# Patient Record
Sex: MALE | Race: OTHER | Employment: PART TIME
[De-identification: names, ages, dates, MRNs, and addresses within clinical notes are randomized per-mention and may not be internally consistent; named-entity substitution may affect disease eponyms.]

---

## 2023-01-08 PROBLEM — Z91.119 DIETARY NONCOMPLIANCE: Status: ACTIVE | Noted: 2023-01-08

## 2023-01-08 PROBLEM — E11.9 NEWLY DIAGNOSED DIABETES (HCC): Status: ACTIVE | Noted: 2023-01-08

## 2023-09-14 ENCOUNTER — NURSE TRIAGE (OUTPATIENT)
Dept: OTHER | Facility: CLINIC | Age: 28
End: 2023-09-14

## 2023-09-14 ENCOUNTER — HOSPITAL ENCOUNTER (OUTPATIENT)
Age: 28
Discharge: HOME | End: 2023-09-14
Payer: MEDICAID

## 2023-09-14 ENCOUNTER — OFFICE VISIT (OUTPATIENT)
Dept: ENDOCRINOLOGY | Age: 28
End: 2023-09-14
Payer: MEDICAID

## 2023-09-14 ENCOUNTER — TELEPHONE (OUTPATIENT)
Dept: ADMINISTRATIVE | Age: 28
End: 2023-09-14

## 2023-09-14 VITALS
RESPIRATION RATE: 18 BRPM | DIASTOLIC BLOOD PRESSURE: 84 MMHG | WEIGHT: 144 LBS | HEART RATE: 89 BPM | BODY MASS INDEX: 23.99 KG/M2 | HEIGHT: 65 IN | OXYGEN SATURATION: 98 % | SYSTOLIC BLOOD PRESSURE: 137 MMHG

## 2023-09-14 DIAGNOSIS — Z91.119 DIETARY NONCOMPLIANCE: ICD-10-CM

## 2023-09-14 DIAGNOSIS — E55.9 VITAMIN D DEFICIENCY: ICD-10-CM

## 2023-09-14 DIAGNOSIS — E11.9 NEWLY DIAGNOSED DIABETES (HCC): Primary | ICD-10-CM

## 2023-09-14 DIAGNOSIS — E11.9 NEWLY DIAGNOSED DIABETES (HCC): ICD-10-CM

## 2023-09-14 LAB
25(OH)D3 SERPL-MCNC: 19.5 NG/ML (ref 30–100)
ALBUMIN SERPL-MCNC: 4.3 G/DL (ref 3.5–5.2)
ALP SERPL-CCNC: 120 U/L (ref 40–129)
ALT SERPL-CCNC: 19 U/L (ref 0–40)
ANION GAP SERPL CALCULATED.3IONS-SCNC: 9 MMOL/L (ref 7–16)
AST SERPL-CCNC: 17 U/L (ref 0–39)
BASOPHILS # BLD: 0.02 K/UL (ref 0–0.2)
BASOPHILS NFR BLD: 1 % (ref 0–2)
BILIRUB SERPL-MCNC: 0.2 MG/DL (ref 0–1.2)
BUN SERPL-MCNC: 12 MG/DL (ref 6–20)
CALCIUM SERPL-MCNC: 9.6 MG/DL (ref 8.6–10.2)
CHLORIDE SERPL-SCNC: 97 MMOL/L (ref 98–107)
CHOLEST SERPL-MCNC: 211 MG/DL
CO2 SERPL-SCNC: 29 MMOL/L (ref 22–29)
CREAT SERPL-MCNC: 0.8 MG/DL (ref 0.7–1.2)
CREAT UR-MCNC: 56.4 MG/DL (ref 40–278)
EOSINOPHIL # BLD: 0.16 K/UL (ref 0.05–0.5)
EOSINOPHILS RELATIVE PERCENT: 4 % (ref 0–6)
ERYTHROCYTE [DISTWIDTH] IN BLOOD BY AUTOMATED COUNT: 12.4 % (ref 11.5–15)
GFR SERPL CREATININE-BSD FRML MDRD: >60 ML/MIN/1.73M2
GLUCOSE SERPL-MCNC: 447 MG/DL (ref 74–99)
HCT VFR BLD AUTO: 46.2 % (ref 37–54)
HDLC SERPL-MCNC: 54 MG/DL
HGB BLD-MCNC: 14.9 G/DL (ref 12.5–16.5)
IMM GRANULOCYTES # BLD AUTO: <0.03 K/UL (ref 0–0.58)
IMM GRANULOCYTES NFR BLD: 0 % (ref 0–5)
LDLC SERPL CALC-MCNC: 124 MG/DL
LYMPHOCYTES NFR BLD: 1.28 K/UL (ref 1.5–4)
LYMPHOCYTES RELATIVE PERCENT: 33 % (ref 20–42)
MCH RBC QN AUTO: 26.9 PG (ref 26–35)
MCHC RBC AUTO-ENTMCNC: 32.3 G/DL (ref 32–34.5)
MCV RBC AUTO: 83.5 FL (ref 80–99.9)
MICROALBUMIN UR-MCNC: <12 MG/L (ref 0–19)
MICROALBUMIN/CREAT UR-RTO: NORMAL MCG/MG CREAT (ref 0–30)
MONOCYTES NFR BLD: 0.23 K/UL (ref 0.1–0.95)
MONOCYTES NFR BLD: 6 % (ref 2–12)
NEUTROPHILS NFR BLD: 57 % (ref 43–80)
NEUTS SEG NFR BLD: 2.21 K/UL (ref 1.8–7.3)
PLATELET # BLD AUTO: 244 K/UL (ref 130–450)
PMV BLD AUTO: 11 FL (ref 7–12)
POTASSIUM SERPL-SCNC: 4.6 MMOL/L (ref 3.5–5)
PROT SERPL-MCNC: 7.2 G/DL (ref 6.4–8.3)
RBC # BLD AUTO: 5.53 M/UL (ref 3.8–5.8)
SODIUM SERPL-SCNC: 135 MMOL/L (ref 132–146)
TRIGL SERPL-MCNC: 166 MG/DL
VLDLC SERPL CALC-MCNC: 33 MG/DL
WBC OTHER # BLD: 3.9 K/UL (ref 4.5–11.5)

## 2023-09-14 PROCEDURE — 80053 COMPREHEN METABOLIC PANEL: CPT

## 2023-09-14 PROCEDURE — 82570 ASSAY OF URINE CREATININE: CPT

## 2023-09-14 PROCEDURE — 82043 UR ALBUMIN QUANTITATIVE: CPT

## 2023-09-14 PROCEDURE — 36415 COLL VENOUS BLD VENIPUNCTURE: CPT

## 2023-09-14 PROCEDURE — 85025 COMPLETE CBC W/AUTO DIFF WBC: CPT

## 2023-09-14 PROCEDURE — 82306 VITAMIN D 25 HYDROXY: CPT

## 2023-09-14 PROCEDURE — 84681 ASSAY OF C-PEPTIDE: CPT

## 2023-09-14 PROCEDURE — 80061 LIPID PANEL: CPT

## 2023-09-14 PROCEDURE — 3051F HG A1C>EQUAL 7.0%<8.0%: CPT | Performed by: FAMILY MEDICINE

## 2023-09-14 PROCEDURE — 99214 OFFICE O/P EST MOD 30 MIN: CPT | Performed by: FAMILY MEDICINE

## 2023-09-14 PROCEDURE — 86341 ISLET CELL ANTIBODY: CPT

## 2023-09-14 RX ORDER — BLOOD-GLUCOSE SENSOR
EACH MISCELLANEOUS
Qty: 6 EACH | Refills: 3 | Status: SHIPPED | OUTPATIENT
Start: 2023-09-14

## 2023-09-14 RX ORDER — INSULIN LISPRO 100 [IU]/ML
INJECTION, SOLUTION INTRAVENOUS; SUBCUTANEOUS
Qty: 5 ADJUSTABLE DOSE PRE-FILLED PEN SYRINGE | Refills: 5 | Status: SHIPPED | OUTPATIENT
Start: 2023-09-14

## 2023-09-14 RX ORDER — INSULIN GLARGINE 100 [IU]/ML
12 INJECTION, SOLUTION SUBCUTANEOUS NIGHTLY
Qty: 5 ADJUSTABLE DOSE PRE-FILLED PEN SYRINGE | Refills: 5 | Status: SHIPPED | OUTPATIENT
Start: 2023-09-14

## 2023-09-14 NOTE — TELEPHONE ENCOUNTER
Location of patient: [address] call from Landon Offer at [employer] with [employer]. Subjective: Caller states \"blood sugar over 400\"     Current Symptoms: elevated blood sugar. Last night 419, used 20 units and then 15 units. Went down to 110 after the 15 units    FBS this morning- 400    Has not taken Metformin for about 2 months. Eating more. Has not been checking glucose until this week. Dizzy, sleep    Pain in back of head from ear to ear    Onset: unknown     Associated Symptoms: reduced activity, increased sleepiness    Pain Severity: 3-4/10; heaviness; intermittent    Temperature: none     What has been tried: nothing    LMP: NA Pregnant: NA    Recommended disposition:  Discuss with PCP and Callback by Nurse within 1 hour. [address] [place] [address] Drive Endocrinology- got the Neurology office. The lady that answered transferred us to Endocrinology at [phone] Phone ringing. Got VM and left message for them to call patient back. Sent triage encounter HIGH PRIORITY to Dr Apurva Trimble. Care advice provided, patient verbalizes understanding; denies any other questions or concerns; instructed to call back for any new or worsening symptoms. Attention Provider: Thank you for allowing me to participate in the care of your patient. The patient was connected to triage in response to information provided to the ECC/PSC. Please do not respond through this encounter as the response is not directed to a shared pool.           Reason for Disposition   Blood glucose > 400 mg/dL (22.2 mmol/L)    Protocols used: Diabetes - High Blood Sugar-ADULT-[place]

## 2023-09-14 NOTE — PROGRESS NOTES
[facility] Department of Endocrinology Diabetes and Metabolism   [address] [facility] [address]   Phone: [phone]  Fax: [phone]    Date of Service: 9/14/2023  Primary Care Physician: No primary care provider on file. Referring physician: No ref. provider found  Provider: HARMEET Oseguera CNP    Reason for the visit:  Newly diagnosed DM     History of Present Illness: The history is provided by the patient. No  was used. Accuracy of the patient data is excellent. Izzy Turner is a very pleasant 25 y.o. male seen today for diabetes management     Izzy Turner was diagnosed with diabetes 9/2022 on a routine labs with A1c 12.6%   Pt was started on long acting insulin for a short time then was able to control his diabetes with dietary changes without medication. Yesterday he developed dizziness, fatigue, and severe headache. Checked his BS > 400. He took a family members rapid acting insulin 25 units twice. At one point his BS dropped to 40 but states it went back up in 400's quickly after eating. Has not been checking blood sugar prior to that. A1C today 10.3%  Most recent A1c results summarized below  Lab Results   Component Value Date/Time    LABA1C 7.3 01/05/2023 02:21 PM    LABA1C 12.6 09/18/2022 12:00 AM     Patient has had no hypoglycemic episodes   The patient has not been mindful of what has been eating and following diabetic diet as encouraged  I reviewed current medications and the patient has no issues with diabetes medications  The patient is due for an eye exam. no h/o diabetic retinopathy  The patient performs his own feet care  Microvascular complications:  No Retinopathy, Nephropathy or Neuropathy   Macrovascular complications: no CAD, PVD, or Stroke      PAST MEDICAL HISTORY   History reviewed. No pertinent past medical history. PAST SURGICAL HISTORY   History reviewed. No pertinent surgical history.     SOCIAL HISTORY

## 2023-09-14 NOTE — TELEPHONE ENCOUNTER
Patient called to schedule with Dr Susy Turner. Said his sugar is 406. I transferred patient to nurse Diogo Childers.

## 2023-09-16 LAB — C PEPTIDE SERPL-MCNC: 3.9 NG/ML (ref 1.1–4.4)

## 2023-09-18 ENCOUNTER — TELEPHONE (OUTPATIENT)
Dept: ENDOCRINOLOGY | Age: 28
End: 2023-09-18

## 2023-09-18 RX ORDER — ATORVASTATIN CALCIUM 20 MG/1
20 TABLET, FILM COATED ORAL DAILY
Qty: 90 TABLET | Refills: 1 | Status: SHIPPED | OUTPATIENT
Start: 2023-09-18

## 2023-09-18 NOTE — TELEPHONE ENCOUNTER
Please let patient know that I have reviewed his labs. Vitamin D is low. I have sent a prescription for vitamin D 50,000 IU weekly x12 weeks. When he is done with those 12 weeks, I recommend OTC vitamin D 2000 IU daily. We can repeat his level at his next office visit. Cholesterol is elevated. This will likely improve with better glucose control, however I recommend starting a atorvastatin 20 mg nightly. I have sent that prescription to his pharmacy. There is still 1 lab pending that will help us determine whether or not he is a type I or type II diabetic. We will call once that is resulted. I reviewed his karie. I would like to increase his Lantus to 14 units nightly. There are times throughout the day where he is borderline hypoglycemic. Can we make sure that he is not giving himself more than the ordered 4 units of Humalog plus MDSS. I will review his Shyla Vidal again next Monday.

## 2023-09-19 ENCOUNTER — TELEPHONE (OUTPATIENT)
Dept: ENDOCRINOLOGY | Age: 28
End: 2023-09-19

## 2023-09-19 LAB — GLUTAMIC ACID DECARB AB: 113.8 IU/ML (ref 0–5)

## 2023-09-19 NOTE — TELEPHONE ENCOUNTER
Please call and let patient know that his NIRALI antibody was elevated. This confirms a diagnosis of type 1 diabetes. He will need to remain on long-acting and short acting insulin for the rest of his life. Please stressed the importance of compliance with his insulin to remain out of DKA. Thank you!

## 2023-09-19 NOTE — TELEPHONE ENCOUNTER
Lvm for return call.  Pt has two result notes in phone encounters from today 09/19/2023. just [facility]

## 2023-09-20 NOTE — TELEPHONE ENCOUNTER
The pt was notified and med list adjusted. He is going to wait to start atorvastatin until he does some research on it. He will call us back.

## 2023-11-09 RX ORDER — CHOLECALCIFEROL (VITAMIN D3) 1250 MCG
CAPSULE ORAL
Qty: 12 CAPSULE | Refills: 0 | OUTPATIENT
Start: 2023-11-09

## 2023-11-15 RX ORDER — BLOOD-GLUCOSE SENSOR
EACH MISCELLANEOUS
Qty: 6 EACH | Refills: 3 | Status: SHIPPED | OUTPATIENT
Start: 2023-11-15

## 2023-11-27 ENCOUNTER — OFFICE VISIT (OUTPATIENT)
Dept: ENDOCRINOLOGY | Age: 28
End: 2023-11-27
Payer: MEDICAID

## 2023-11-27 VITALS
RESPIRATION RATE: 18 BRPM | DIASTOLIC BLOOD PRESSURE: 73 MMHG | WEIGHT: 152.8 LBS | HEIGHT: 65 IN | BODY MASS INDEX: 25.46 KG/M2 | HEART RATE: 79 BPM | OXYGEN SATURATION: 91 % | SYSTOLIC BLOOD PRESSURE: 103 MMHG

## 2023-11-27 DIAGNOSIS — Z91.119 DIETARY NONCOMPLIANCE: ICD-10-CM

## 2023-11-27 DIAGNOSIS — R42 DIZZINESS: ICD-10-CM

## 2023-11-27 DIAGNOSIS — E78.2 MIXED HYPERLIPIDEMIA: ICD-10-CM

## 2023-11-27 DIAGNOSIS — E10.65 TYPE 1 DIABETES MELLITUS WITH HYPERGLYCEMIA (HCC): Primary | ICD-10-CM

## 2023-11-27 DIAGNOSIS — E55.9 VITAMIN D DEFICIENCY: ICD-10-CM

## 2023-11-27 LAB — HBA1C MFR BLD: 7 %

## 2023-11-27 PROCEDURE — 83036 HEMOGLOBIN GLYCOSYLATED A1C: CPT | Performed by: FAMILY MEDICINE

## 2023-11-27 PROCEDURE — 3051F HG A1C>EQUAL 7.0%<8.0%: CPT | Performed by: FAMILY MEDICINE

## 2023-11-27 PROCEDURE — 95251 CONT GLUC MNTR ANALYSIS I&R: CPT | Performed by: FAMILY MEDICINE

## 2023-11-27 PROCEDURE — 99214 OFFICE O/P EST MOD 30 MIN: CPT | Performed by: FAMILY MEDICINE

## 2023-11-27 NOTE — PROGRESS NOTES
[facility] Department of Endocrinology Diabetes and Metabolism   [address] [facility] [place], [address] [facility]    Phone: [phone]  Fax: [phone]    Date of Service: 11/27/2023  Primary Care Physician: Serge Braxton MD  Referring physician: No ref. provider found  Provider: HARMEET Acosta CNP    Reason for the visit:  Type 1 Diabetes    History of Present Illness: The history is provided by the patient. No  was used. Accuracy of the patient data is excellent. Armond Andrade is a very pleasant 25 y.o. male seen today for diabetes management     Armond Andrade was diagnosed with diabetes 9/2022 on a routine labs with A1c 12.6%   Pt was started on long acting insulin for a short time then was able to control his diabetes with dietary changes without medication. C-peptide and emily antibody were ordered at that time, however he did not complete. He was lost to follow-up, then presented in September 2023 with hyperglycemia. C-peptide and emily antibody were ordered again at his September 2023 appointment and confirmed LENARD . Latest Reference Range & Units 09/14/23 16:48   C-Peptide 1.1 - 4.4 ng/mL 3.9      Latest Reference Range & Units 09/14/23 16:48   Glutamic Acid Decarb Ab 0.0 - 5.0 IU/mL 113.8 (H)       He is currently taking Lantus 14 units nightly and Humalog 4 units plus MDSS    Checking BS via Raman:  TIR 85%  Hyperglycemia 15%  Hypoglycemia 0%  Avg glucose 143  GMI 6.7%      Reports feeling dizzy since his last visit. States he is having a hard time getting through the workday. States it is not related to hypoglycemia. He was started on a atorvastatin at his last visit.       Most recent A1c results summarized below  Lab Results   Component Value Date/Time    LABA1C 7.0 11/27/2023 01:48 PM    LABA1C 7.3 01/05/2023 02:21 PM    LABA1C 12.6 09/18/2022 12:00 AM     Patient has had few hypoglycemic episodes   The patient has not been mindful of what has

## 2024-02-27 ENCOUNTER — OFFICE VISIT (OUTPATIENT)
Dept: ENDOCRINOLOGY | Age: 29
End: 2024-02-27
Payer: MEDICAID

## 2024-02-27 VITALS
OXYGEN SATURATION: 95 % | HEIGHT: 65 IN | HEART RATE: 92 BPM | SYSTOLIC BLOOD PRESSURE: 117 MMHG | DIASTOLIC BLOOD PRESSURE: 63 MMHG | BODY MASS INDEX: 25.99 KG/M2 | WEIGHT: 156 LBS | RESPIRATION RATE: 18 BRPM

## 2024-02-27 DIAGNOSIS — E55.9 VITAMIN D DEFICIENCY: ICD-10-CM

## 2024-02-27 DIAGNOSIS — Z91.119 DIETARY NONCOMPLIANCE: ICD-10-CM

## 2024-02-27 DIAGNOSIS — E78.2 MIXED HYPERLIPIDEMIA: ICD-10-CM

## 2024-02-27 DIAGNOSIS — E10.65 TYPE 1 DIABETES MELLITUS WITH HYPERGLYCEMIA (HCC): Primary | ICD-10-CM

## 2024-02-27 PROCEDURE — 99214 OFFICE O/P EST MOD 30 MIN: CPT | Performed by: FAMILY MEDICINE

## 2024-02-27 RX ORDER — INSULIN PMP CART,AUT,G6/7,CNTR
EACH SUBCUTANEOUS
Qty: 10 EACH | Refills: 11 | Status: SHIPPED | OUTPATIENT
Start: 2024-02-27

## 2024-02-27 RX ORDER — PROCHLORPERAZINE 25 MG/1
1 SUPPOSITORY RECTAL ONCE
Qty: 1 EACH | Refills: 0 | Status: SHIPPED | OUTPATIENT
Start: 2024-02-27 | End: 2024-02-27

## 2024-02-27 RX ORDER — INSULIN PMP CART,AUT,G6/7,CNTR
1 EACH SUBCUTANEOUS ONCE
Qty: 1 KIT | Refills: 0 | Status: SHIPPED | OUTPATIENT
Start: 2024-02-27 | End: 2024-02-27

## 2024-02-27 RX ORDER — INSULIN GLARGINE 100 [IU]/ML
14 INJECTION, SOLUTION SUBCUTANEOUS NIGHTLY
Qty: 5 ADJUSTABLE DOSE PRE-FILLED PEN SYRINGE | Refills: 5 | Status: SHIPPED | OUTPATIENT
Start: 2024-02-27

## 2024-02-27 RX ORDER — PROCHLORPERAZINE 25 MG/1
SUPPOSITORY RECTAL
Qty: 1 EACH | Refills: 3 | Status: SHIPPED | OUTPATIENT
Start: 2024-02-27

## 2024-02-27 RX ORDER — PROCHLORPERAZINE 25 MG/1
SUPPOSITORY RECTAL
Qty: 9 EACH | Refills: 3 | Status: SHIPPED | OUTPATIENT
Start: 2024-02-27

## 2024-02-27 RX ORDER — INSULIN LISPRO 100 [IU]/ML
INJECTION, SOLUTION INTRAVENOUS; SUBCUTANEOUS
Qty: 8 ADJUSTABLE DOSE PRE-FILLED PEN SYRINGE | Refills: 5 | Status: SHIPPED | OUTPATIENT
Start: 2024-02-27

## 2024-02-27 RX ORDER — BLOOD-GLUCOSE SENSOR
EACH MISCELLANEOUS
Qty: 6 EACH | Refills: 3 | Status: SHIPPED | OUTPATIENT
Start: 2024-02-27

## 2024-02-27 NOTE — PROGRESS NOTES
for 1 dose 1 each 0    Continuous Blood Gluc Sensor (DEXCOM G6 SENSOR) MISC Change sensors every 10 days 9 each 3    Continuous Blood Gluc Transmit (DEXCOM G6 TRANSMITTER) MISC Change every 90 days 1 each 3    vitamin D (CHOLECALCIFEROL) 80355 UNIT CAPS Take 1 capsule weekly for 12 weeks only. No refills 12 capsule 0    atorvastatin (LIPITOR) 20 MG tablet Take 1 tablet by mouth daily 90 tablet 1    Insulin Pen Needle 31G X 8 MM MISC Uses 4 times a day with insulin 250 each 5    vitamin D (ERGOCALCIFEROL) 1.25 MG (35156 UT) CAPS capsule        No current facility-administered medications for this visit.       Review of Systems  Constitutional: No fever, no chills, no diaphoresis, no generalized weakness.  HEENT: No blurred vision, No sore throat, no ear pain, no hair loss  Neck: denied any neck swelling, difficulty swallowing,   Cardio-pulmonary: No CP, SOB or palpitation, No orthopnea or PND. No cough or wheezing.  GI: No N/V/D, no constipation, No abdominal pain, no melena or hematochezia   : Denied any dysuria, hematuria, flank pain, discharge, or incontinence.   Skin: denied any rash, ulcer, Hirsute, or hyperpigmentation.   MSK: denied any joint deformity, joint pain/swelling, muscle pain, or back pain.  Neuro: no numbness, no tingling, no weakness, _    OBJECTIVE    /63   Pulse 92   Resp 18   Ht 1.651 m (5' 5\")   Wt 70.8 kg (156 lb)   SpO2 95%   BMI 25.96 kg/m²   BP Readings from Last 4 Encounters:   02/27/24 117/63   11/27/23 103/73   09/14/23 137/84   01/05/23 134/85     Wt Readings from Last 6 Encounters:   02/27/24 70.8 kg (156 lb)   11/27/23 69.3 kg (152 lb 12.8 oz)   09/14/23 65.3 kg (144 lb)   01/05/23 65.8 kg (145 lb)       Physical examination:  General: awake alert, oriented x3, no abnormal position or movements.  HEENT: normocephalic non-traumatic, no exophthalmos   Neck: supple, no LN enlargement, no thyromegaly, no thyroid tenderness, no JVD.  Pulm: Clear equal air entry no added

## 2024-03-13 ENCOUNTER — TELEPHONE (OUTPATIENT)
Dept: ENDOCRINOLOGY | Age: 29
End: 2024-03-13

## 2024-03-22 RX ORDER — ATORVASTATIN CALCIUM 20 MG/1
20 TABLET, FILM COATED ORAL DAILY
Qty: 90 TABLET | Refills: 1 | Status: SHIPPED | OUTPATIENT
Start: 2024-03-22

## 2024-10-28 RX ORDER — INSULIN GLARGINE 100 [IU]/ML
14 INJECTION, SOLUTION SUBCUTANEOUS NIGHTLY
Qty: 15 ML | Refills: 0 | Status: SHIPPED | OUTPATIENT
Start: 2024-10-28

## 2024-10-28 RX ORDER — INSULIN LISPRO 100 [IU]/ML
INJECTION, SOLUTION INTRAVENOUS; SUBCUTANEOUS
Qty: 69 ML | Refills: 0 | Status: SHIPPED | OUTPATIENT
Start: 2024-10-28

## 2025-02-12 ENCOUNTER — OFFICE VISIT (OUTPATIENT)
Dept: ENDOCRINOLOGY | Age: 30
End: 2025-02-12

## 2025-02-12 VITALS
HEIGHT: 65 IN | SYSTOLIC BLOOD PRESSURE: 122 MMHG | DIASTOLIC BLOOD PRESSURE: 76 MMHG | WEIGHT: 152 LBS | BODY MASS INDEX: 25.33 KG/M2 | TEMPERATURE: 98.3 F

## 2025-02-12 DIAGNOSIS — E10.65 TYPE 1 DIABETES MELLITUS WITH HYPERGLYCEMIA (HCC): Primary | ICD-10-CM

## 2025-02-12 LAB — HBA1C MFR BLD: 6.4 %

## 2025-02-12 RX ORDER — HYDROCHLOROTHIAZIDE 12.5 MG/1
CAPSULE ORAL
Qty: 6 EACH | Refills: 3 | Status: SHIPPED | OUTPATIENT
Start: 2025-02-12

## 2025-02-12 RX ORDER — INSULIN GLARGINE 100 [IU]/ML
14 INJECTION, SOLUTION SUBCUTANEOUS NIGHTLY
Qty: 15 ML | Refills: 0 | Status: SHIPPED | OUTPATIENT
Start: 2025-02-12

## 2025-02-12 RX ORDER — INSULIN LISPRO 100 [IU]/ML
INJECTION, SOLUTION INTRAVENOUS; SUBCUTANEOUS
Qty: 69 ML | Refills: 0 | Status: SHIPPED | OUTPATIENT
Start: 2025-02-12

## 2025-02-12 NOTE — PROGRESS NOTES
[facility]  [facility] Department of Endocrinology Diabetes and Metabolism   [address] [place] [address]    Phone: [phone]  Fax: [phone]    Date of Service: 2/12/2025  Primary Care Physician: Lucho Godinez MD  Referring physician: No ref. provider found  Provider: HARMEET Odonnell NP    Reason for the visit:  Type 1 Diabetes    History of Present Illness:  The history is provided by the patient. No  was used. Accuracy of the patient data is excellent.  Maggie Grande is a very pleasant 29 y.o. male seen today for diabetes management     Maggie Grande was diagnosed with diabetes 9/2022 on a routine labs with A1c 12.6%   Pt was started on long acting insulin for a short time then was able to control his diabetes with dietary changes without medication.  C-peptide and emily antibody were ordered at that time, however he did not complete.  He was lost to follow-up, then presented in September 2023 with hyperglycemia.    Did trial Omnipod butd id not like use    C-peptide and emily antibody were ordered again at his September 2023 appointment and confirmed LENARD .   Latest Reference Range & Units 09/14/23 16:48   C-Peptide 1.1 - 4.4 ng/mL 3.9      Latest Reference Range & Units 09/14/23 16:48   Glutamic Acid Decarb Ab 0.0 - 5.0 IU/mL 113.8 (H)       He is currently taking Lantus 14 units nightly and Humalog 8-10 units plus MDSS    Has been out of karie's and has been checking his blood sugar via fingerstick.  States fasting numbers are less than 130, but preprandial and postprandial numbers are often times i150's       Most recent A1c results summarized below  Lab Results   Component Value Date/Time    LABA1C 6.4 02/12/2025 03:36 PM    LABA1C 7.0 11/27/2023 01:48 PM    LABA1C 7.3 01/05/2023 02:21 PM     Patient has had few hypoglycemic episodes,(40's) due to not eating and physical   The patient has not been mindful of what has been eating and following

## 2025-02-21 ENCOUNTER — APPOINTMENT (OUTPATIENT)
Dept: GENERAL RADIOLOGY | Age: 30
End: 2025-02-21
Payer: MEDICAID

## 2025-02-21 ENCOUNTER — HOSPITAL ENCOUNTER (EMERGENCY)
Age: 30
Discharge: HOME | End: 2025-02-21
Attending: EMERGENCY MEDICINE
Payer: MEDICAID

## 2025-02-21 VITALS
OXYGEN SATURATION: 98 % | DIASTOLIC BLOOD PRESSURE: 72 MMHG | BODY MASS INDEX: 24.96 KG/M2 | RESPIRATION RATE: 20 BRPM | HEART RATE: 72 BPM | WEIGHT: 150 LBS | TEMPERATURE: 97.3 F | SYSTOLIC BLOOD PRESSURE: 111 MMHG

## 2025-02-21 DIAGNOSIS — R07.9 CHEST PAIN, UNSPECIFIED TYPE: Primary | ICD-10-CM

## 2025-02-21 LAB
ALBUMIN SERPL-MCNC: 5 G/DL (ref 3.5–5.2)
ALP SERPL-CCNC: 67 U/L (ref 40–129)
ALT SERPL-CCNC: 19 U/L (ref 0–40)
ANION GAP SERPL CALCULATED.3IONS-SCNC: 12 MMOL/L (ref 7–16)
AST SERPL-CCNC: 25 U/L (ref 0–39)
BASOPHILS # BLD: 0.03 K/UL (ref 0–0.2)
BASOPHILS NFR BLD: 1 % (ref 0–2)
BILIRUB SERPL-MCNC: 0.6 MG/DL (ref 0–1.2)
BUN SERPL-MCNC: 9 MG/DL (ref 6–20)
CALCIUM SERPL-MCNC: 9.6 MG/DL (ref 8.6–10.2)
CHLORIDE SERPL-SCNC: 102 MMOL/L (ref 98–107)
CO2 SERPL-SCNC: 25 MMOL/L (ref 22–29)
CREAT SERPL-MCNC: 0.8 MG/DL (ref 0.7–1.2)
D-DIMER QUANTITATIVE: <200 NG/ML DDU (ref 0–230)
EOSINOPHIL # BLD: 0.27 K/UL (ref 0.05–0.5)
EOSINOPHILS RELATIVE PERCENT: 7 % (ref 0–6)
ERYTHROCYTE [DISTWIDTH] IN BLOOD BY AUTOMATED COUNT: 13.2 % (ref 11.5–15)
GFR, ESTIMATED: >90 ML/MIN/1.73M2
GLUCOSE SERPL-MCNC: 121 MG/DL (ref 74–99)
HCT VFR BLD AUTO: 47.7 % (ref 37–54)
HGB BLD-MCNC: 15.9 G/DL (ref 12.5–16.5)
IMM GRANULOCYTES # BLD AUTO: <0.03 K/UL (ref 0–0.58)
IMM GRANULOCYTES NFR BLD: 0 % (ref 0–5)
LYMPHOCYTES NFR BLD: 2.09 K/UL (ref 1.5–4)
LYMPHOCYTES RELATIVE PERCENT: 53 % (ref 20–42)
MCH RBC QN AUTO: 27.8 PG (ref 26–35)
MCHC RBC AUTO-ENTMCNC: 33.3 G/DL (ref 32–34.5)
MCV RBC AUTO: 83.5 FL (ref 80–99.9)
MONOCYTES NFR BLD: 0.26 K/UL (ref 0.1–0.95)
MONOCYTES NFR BLD: 7 % (ref 2–12)
NEUTROPHILS NFR BLD: 33 % (ref 43–80)
NEUTS SEG NFR BLD: 1.31 K/UL (ref 1.8–7.3)
PLATELET # BLD AUTO: 264 K/UL (ref 130–450)
PMV BLD AUTO: 10.4 FL (ref 7–12)
POTASSIUM SERPL-SCNC: 4 MMOL/L (ref 3.5–5)
PROT SERPL-MCNC: 7.9 G/DL (ref 6.4–8.3)
RBC # BLD AUTO: 5.71 M/UL (ref 3.8–5.8)
SODIUM SERPL-SCNC: 139 MMOL/L (ref 132–146)
TROPONIN I SERPL HS-MCNC: <6 NG/L (ref 0–11)
TROPONIN I SERPL HS-MCNC: <6 NG/L (ref 0–11)
WBC OTHER # BLD: 4 K/UL (ref 4.5–11.5)

## 2025-02-21 PROCEDURE — 6370000000 HC RX 637 (ALT 250 FOR IP): Performed by: EMERGENCY MEDICINE

## 2025-02-21 PROCEDURE — 85025 COMPLETE CBC W/AUTO DIFF WBC: CPT

## 2025-02-21 PROCEDURE — 84484 ASSAY OF TROPONIN QUANT: CPT

## 2025-02-21 PROCEDURE — 6360000002 HC RX W HCPCS: Performed by: EMERGENCY MEDICINE

## 2025-02-21 PROCEDURE — 71046 X-RAY EXAM CHEST 2 VIEWS: CPT

## 2025-02-21 PROCEDURE — 93005 ELECTROCARDIOGRAM TRACING: CPT | Performed by: EMERGENCY MEDICINE

## 2025-02-21 PROCEDURE — 80053 COMPREHEN METABOLIC PANEL: CPT

## 2025-02-21 PROCEDURE — 96374 THER/PROPH/DIAG INJ IV PUSH: CPT

## 2025-02-21 PROCEDURE — 85379 FIBRIN DEGRADATION QUANT: CPT

## 2025-02-21 PROCEDURE — 99285 EMERGENCY DEPT VISIT HI MDM: CPT

## 2025-02-21 RX ORDER — KETOROLAC TROMETHAMINE 30 MG/ML
15 INJECTION, SOLUTION INTRAMUSCULAR; INTRAVENOUS ONCE
Status: COMPLETED | OUTPATIENT
Start: 2025-02-21 | End: 2025-02-21

## 2025-02-21 RX ORDER — ASPIRIN 81 MG/1
324 TABLET, CHEWABLE ORAL ONCE
Status: COMPLETED | OUTPATIENT
Start: 2025-02-21 | End: 2025-02-21

## 2025-02-21 RX ORDER — NITROGLYCERIN 0.4 MG/1
0.4 TABLET SUBLINGUAL ONCE
Status: COMPLETED | OUTPATIENT
Start: 2025-02-21 | End: 2025-02-21

## 2025-02-21 RX ADMIN — KETOROLAC TROMETHAMINE 15 MG: 30 INJECTION, SOLUTION INTRAMUSCULAR at 09:32

## 2025-02-21 RX ADMIN — ASPIRIN 81 MG CHEWABLE TABLET 324 MG: 81 TABLET CHEWABLE at 06:29

## 2025-02-21 RX ADMIN — NITROGLYCERIN 0.4 MG: 0.4 TABLET, ORALLY DISINTEGRATING SUBLINGUAL at 06:34

## 2025-02-21 ASSESSMENT — PAIN SCALES - GENERAL
PAINLEVEL_OUTOF10: 0
PAINLEVEL_OUTOF10: 2
PAINLEVEL_OUTOF10: 2
PAINLEVEL_OUTOF10: 0

## 2025-02-21 ASSESSMENT — PAIN - FUNCTIONAL ASSESSMENT
PAIN_FUNCTIONAL_ASSESSMENT: NONE - DENIES PAIN
PAIN_FUNCTIONAL_ASSESSMENT: 0-10
PAIN_FUNCTIONAL_ASSESSMENT: ACTIVITIES ARE NOT PREVENTED

## 2025-02-21 ASSESSMENT — PAIN DESCRIPTION - ORIENTATION
ORIENTATION: LEFT

## 2025-02-21 ASSESSMENT — PAIN DESCRIPTION - LOCATION
LOCATION: CHEST

## 2025-02-21 ASSESSMENT — LIFESTYLE VARIABLES
HOW OFTEN DO YOU HAVE A DRINK CONTAINING ALCOHOL: 4 OR MORE TIMES A WEEK
HOW MANY STANDARD DRINKS CONTAINING ALCOHOL DO YOU HAVE ON A TYPICAL DAY: 3 OR 4

## 2025-02-21 ASSESSMENT — PAIN DESCRIPTION - DESCRIPTORS: DESCRIPTORS: PRESSURE

## 2025-02-21 ASSESSMENT — HEART SCORE: ECG: NORMAL

## 2025-02-21 NOTE — ED PROVIDER NOTES
[facility] EMERGENCY DEPARTMENT  EMERGENCY DEPARTMENT ENCOUNTER        Pt Name: Maggie Grande  MRN: [id number]  Birthdate 1995  Date of evaluation: 2/21/2025  Provider: Charles Vásquez MD  PCP: Lucho Godinez MD  Note Started: 6:38 AM EST 2/21/25    CHIEF COMPLAINT       Chief Complaint   Patient presents with    Chest Pain     Pt complaint of left sided chest pain/pressure and SOB that started about an hour ago. Pt then check his blood pressure which pt stated was high     Shortness of Breath    Hypertension       HISTORY OF PRESENT ILLNESS: 1 or more Elements        Maggie Grande is a 29 y.o. male who presents for chest pain.  Patient reports left-sided chest pain that started about an hour ago.  He reports a pressure type pain with mild shortness of breath.  He said that he his blood pressure was high as well.  He said he felt like a tightness in his chest.  No ripping or tearing pain.  No sharp or stabbing pain.  No orthopnea.  No peripheral edema.  No history of DVT or PE or aneurysm.  No history pneumothorax.  He denies any cardiac history.  Denies any history of hypertension.  He reports that he is a type I diabetic that was diagnosed last year.    Nursing Notes were all reviewed and agreed with or any disagreements were addressed in the HPI.      REVIEW OF EXTERNAL NOTE :       2/12/2025 outpatient office notes      Chart Review/External Note Review    Last Echo reviewed by Me:  No results found for: \"LVEF\", \"LVEFMODE\"          Controlled Substance Monitoring:    Acute and Chronic Pain Monitoring:        No data to display                    REVIEW OF SYSTEMS :      Positives and Pertinent negatives as per HPI.     SURGICAL HISTORY   History reviewed. No pertinent surgical history.    CURRENTMEDICATIONS       Previous Medications    CONTINUOUS BLOOD GLUC SENSOR (FREESTYLE MIQUEL 3 SENSOR) MISC    Change sensor every 14 days    CONTINUOUS GLUCOSE SENSOR (FREESTYLE MIQUEL 3 PLUS  SENSOR) MISC    Apply every 15 days    INSULIN GLARGINE (LANTUS SOLOSTAR) 100 UNIT/ML INJECTION PEN    Inject 14 Units into the skin nightly 14 units once daily    INSULIN LISPRO, 1 UNIT DIAL, (HUMALOG KWIKPEN) 100 UNIT/ML SOPN    Inject 10 units three times a day with meals + -200 add 2 Units, -250 add 4 Units , -300 add 6 Units, -350 add 8 Units , -400 add 10 Units , BS over 400 add 12 units  Max daily dose 75 units    INSULIN PEN NEEDLE 31G X 8 MM MISC    Uses 4 times a day with insulin    VITAMIN D (CHOLECALCIFEROL) 91512 UNIT CAPS    Take 1 capsule weekly for 12 weeks only. No refills    VITAMIN D (ERGOCALCIFEROL) 1.25 MG (38476 UT) CAPS CAPSULE           ALLERGIES     Patient has no known allergies.    FAMILYHISTORY     History reviewed. No pertinent family history.     SOCIAL HISTORY       Social History     Tobacco Use    Smoking status: Every Day     Types: Cigarettes, Pipe    Smokeless tobacco: Never   Substance Use Topics    Alcohol use: Yes    Drug use: Yes     Types: Marijuana (Weed)       SCREENINGS        [place] Coma Scale  Eye Opening: Spontaneous  Best Verbal Response: Oriented  Best Motor Response: Obeys commands  [place] Coma Scale Score: 15        Heart Score for chest pain patients  History: Slightly Suspicious  ECG: Normal  Patient Age: < 45 years  *Risk factors for Atherosclerotic disease: Diabetes Mellitus  Risk Factors: 1 or 2 risk factors  Troponin: < 1X normal limit  Heart Score Total: 1       CIWA Assessment  BP: 121/88  Pulse: 92           PHYSICAL EXAM  1 or more Elements     ED Triage Vitals   BP Systolic BP Percentile Diastolic BP Percentile Temp Temp Source Pulse Respirations SpO2   02/21/25 0528 -- -- 02/21/25 0528 02/21/25 0528 02/21/25 0528 02/21/25 0552 02/21/25 0528   (!) 180/107   97.3 °F (36.3 °C) Temporal (!) 111 16 97 %      Height Weight - Scale         -- 02/21/25 0552          68 kg (150 lb)                  Constitutional/General: Alert and

## 2025-02-22 LAB
EKG ATRIAL RATE: 93 BPM
EKG P AXIS: 52 DEGREES
EKG P-R INTERVAL: 134 MS
EKG Q-T INTERVAL: 358 MS
EKG QRS DURATION: 90 MS
EKG QTC CALCULATION (BAZETT): 445 MS
EKG R AXIS: 55 DEGREES
EKG T AXIS: 10 DEGREES
EKG VENTRICULAR RATE: 93 BPM

## 2025-02-22 PROCEDURE — 93010 ELECTROCARDIOGRAM REPORT: CPT | Performed by: INTERNAL MEDICINE

## 2025-03-13 RX ORDER — INSULIN LISPRO 100 [IU]/ML
INJECTION, SOLUTION INTRAVENOUS; SUBCUTANEOUS
Qty: 50 ML | Refills: 1 | Status: SHIPPED | OUTPATIENT
Start: 2025-03-13